# Patient Record
Sex: FEMALE | Race: WHITE | Employment: OTHER | URBAN - METROPOLITAN AREA
[De-identification: names, ages, dates, MRNs, and addresses within clinical notes are randomized per-mention and may not be internally consistent; named-entity substitution may affect disease eponyms.]

---

## 2017-04-27 PROCEDURE — 86480 TB TEST CELL IMMUN MEASURE: CPT | Performed by: INTERNAL MEDICINE

## 2017-04-27 PROCEDURE — 36415 COLL VENOUS BLD VENIPUNCTURE: CPT | Performed by: INTERNAL MEDICINE

## 2017-07-20 PROBLEM — M54.50 CHRONIC BILATERAL LOW BACK PAIN WITHOUT SCIATICA: Status: ACTIVE | Noted: 2017-07-20

## 2017-07-20 PROBLEM — G89.29 CHRONIC BILATERAL LOW BACK PAIN WITHOUT SCIATICA: Status: ACTIVE | Noted: 2017-07-20

## 2017-08-18 PROCEDURE — 88305 TISSUE EXAM BY PATHOLOGIST: CPT | Performed by: FAMILY MEDICINE

## 2018-04-26 PROCEDURE — 82746 ASSAY OF FOLIC ACID SERUM: CPT | Performed by: FAMILY MEDICINE

## 2018-04-26 PROCEDURE — 82607 VITAMIN B-12: CPT | Performed by: FAMILY MEDICINE

## 2018-04-26 PROCEDURE — 86480 TB TEST CELL IMMUN MEASURE: CPT | Performed by: FAMILY MEDICINE

## 2018-04-26 PROCEDURE — 80074 ACUTE HEPATITIS PANEL: CPT | Performed by: FAMILY MEDICINE

## 2018-11-05 PROBLEM — M76.891 TENDINITIS OF RIGHT HIP FLEXOR: Status: ACTIVE | Noted: 2018-11-05

## 2019-06-13 PROBLEM — D84.9 IMMUNOSUPPRESSION (HCC): Status: ACTIVE | Noted: 2019-06-13

## 2019-09-12 PROCEDURE — 86480 TB TEST CELL IMMUN MEASURE: CPT | Performed by: INTERNAL MEDICINE

## 2020-06-12 PROBLEM — H93.8X2 CLOGGED EAR, LEFT: Status: ACTIVE | Noted: 2020-06-12

## 2020-06-12 PROBLEM — H91.92 DECREASED HEARING OF LEFT EAR: Status: ACTIVE | Noted: 2020-06-12

## 2021-11-02 PROBLEM — G89.29 CHRONIC PAIN OF BOTH KNEES: Status: ACTIVE | Noted: 2021-11-02

## 2021-11-02 PROBLEM — M17.0 OSTEOARTHRITIS OF PATELLOFEMORAL JOINTS OF BOTH KNEES: Status: ACTIVE | Noted: 2021-11-02

## 2021-11-02 PROBLEM — M25.562 CHRONIC PAIN OF BOTH KNEES: Status: ACTIVE | Noted: 2021-11-02

## 2021-11-02 PROBLEM — M25.561 CHRONIC PAIN OF BOTH KNEES: Status: ACTIVE | Noted: 2021-11-02

## 2022-04-05 PROBLEM — M25.561 CHRONIC PAIN OF BOTH KNEES: Status: RESOLVED | Noted: 2021-11-02 | Resolved: 2022-04-05

## 2022-04-05 PROBLEM — G89.29 CHRONIC PAIN OF BOTH KNEES: Status: RESOLVED | Noted: 2021-11-02 | Resolved: 2022-04-05

## 2022-04-05 PROBLEM — H93.8X2 CLOGGED EAR, LEFT: Status: RESOLVED | Noted: 2020-06-12 | Resolved: 2022-04-05

## 2022-04-05 PROBLEM — M25.562 CHRONIC PAIN OF BOTH KNEES: Status: RESOLVED | Noted: 2021-11-02 | Resolved: 2022-04-05

## 2022-04-05 PROBLEM — H91.92 DECREASED HEARING OF LEFT EAR: Status: RESOLVED | Noted: 2020-06-12 | Resolved: 2022-04-05

## 2022-10-19 ENCOUNTER — ANESTHESIA EVENT (OUTPATIENT)
Dept: ENDOSCOPY | Facility: HOSPITAL | Age: 67
End: 2022-10-19
Payer: MEDICARE

## 2022-10-19 ENCOUNTER — HOSPITAL ENCOUNTER (OUTPATIENT)
Facility: HOSPITAL | Age: 67
Setting detail: HOSPITAL OUTPATIENT SURGERY
Discharge: HOME | End: 2022-10-19
Attending: INTERNAL MEDICINE | Admitting: INTERNAL MEDICINE
Payer: MEDICARE

## 2022-10-19 ENCOUNTER — ANESTHESIA (OUTPATIENT)
Dept: ENDOSCOPY | Facility: HOSPITAL | Age: 67
End: 2022-10-19
Payer: MEDICARE

## 2022-10-19 VITALS
DIASTOLIC BLOOD PRESSURE: 71 MMHG | SYSTOLIC BLOOD PRESSURE: 139 MMHG | WEIGHT: 216 LBS | HEIGHT: 68 IN | HEART RATE: 60 BPM | OXYGEN SATURATION: 100 % | BODY MASS INDEX: 32.74 KG/M2 | TEMPERATURE: 98 F | RESPIRATION RATE: 18 BRPM

## 2022-10-19 DIAGNOSIS — D12.2 ADENOMATOUS POLYP OF ASCENDING COLON: ICD-10-CM

## 2022-10-19 PROCEDURE — 88305 TISSUE EXAM BY PATHOLOGIST: CPT | Performed by: INTERNAL MEDICINE

## 2022-10-19 PROCEDURE — 0DBC8ZX EXCISION OF ILEOCECAL VALVE, VIA NATURAL OR ARTIFICIAL OPENING ENDOSCOPIC, DIAGNOSTIC: ICD-10-PCS | Performed by: INTERNAL MEDICINE

## 2022-10-19 RX ORDER — SODIUM CHLORIDE, SODIUM LACTATE, POTASSIUM CHLORIDE, CALCIUM CHLORIDE 600; 310; 30; 20 MG/100ML; MG/100ML; MG/100ML; MG/100ML
INJECTION, SOLUTION INTRAVENOUS CONTINUOUS
Status: DISCONTINUED | OUTPATIENT
Start: 2022-10-19 | End: 2022-10-19

## 2022-10-19 RX ADMIN — SODIUM CHLORIDE, SODIUM LACTATE, POTASSIUM CHLORIDE, CALCIUM CHLORIDE: 600; 310; 30; 20 INJECTION, SOLUTION INTRAVENOUS at 14:17:00

## 2022-10-19 RX ADMIN — SODIUM CHLORIDE, SODIUM LACTATE, POTASSIUM CHLORIDE, CALCIUM CHLORIDE: 600; 310; 30; 20 INJECTION, SOLUTION INTRAVENOUS at 13:23:00

## 2022-10-19 NOTE — OPERATIVE REPORT
[id number]  Brennan Rudolph  1/17/1955  10/19/2022      Preoperative Diagnosis: Recurrent tubulovillous adenomatous polyp the patient was referred for endoscopic mucosal resection  Postoperative Diagnosis: Ileocecal valve adenomatous polyp  Procedure Performed: Colonoscopy to the cecum with endoscopic mucosal resection    Anesthesia Given:   MAC  Colon  Preparation: Adequate  Colonoscopy withdrawal time: 48 minutes  Specimen: IVC polyp  Endoscopist:   Reagan Saldivar MD  EBL: none  Complications: no immediate  Informed Consent: Informed consent for both the procedure and sedation were obtained from the patient. The potentially life-threatening complications of sedation, bleeding,  Perforation, transfusion or repeat endoscopy were reviewed along with the possible need for hospitalization, surgical management, transfusion or repeat endoscopy should one of these complications arise. The patient understands and is agreeable to proceed. Procedure:   After the patient was interviewed and the procedure again discussed and questions addressed, the patient was brought to the GI Lab and monitoring of the B/P, pulse, and pulse oximetry was performed. The patient was then placed in the left lateral decubitus position and sedated with divided doses of IV medication; continuous vital signs were monitored throughout the procedure. The Olympus video colonoscope was then inserted into the rectum after an unremarkable digital rectal exam. The endoscope was advanced to the cecum without difficulty; upon insertion and withdrawl the mucosa was carefully examined and the preparation was adequate. The visualized mucosal pattern was unremarkable. The distal end clear detachable cap was fitted onto the tip of the scope prior to inserting it into the rectum. At the level of ileocecal valve on the ascending: Aspect of the there was a large sessile polyp. It measured approximately 2-1/2 cm.   An attempt to lift the polyp with injection of ORISE solution was unsuccessful due to with appeared to be scarring as well as fatty infiltration of the ileocecal valve. The polyp was resected with 13 mm hexagonal snare. Avulsion technique utilizing cold biopsy forceps was performed. The margins of resection were fulgurated with the soft coag forceps. Few superficial oozing blood vessels were controlled with the use of the soft coag grasper. At the anal verge the endoscope was retroverted and other than some small internal hemorrhoids, no other abnormalities were identified. The procedure was tolerated well and upon completion and throughout the vital signs were stable. The patient was informed of the endoscopic findings and was also given a copy of the findings, postoperative instructions, and postoperative precautions. IMPRESSION:  Findings described above    PLAN:  We will await final pathology results. We will discussed with the patient possible repeat colonoscopy with use of Endo router in 6 to 8 weeks versus referral for surgical resection however she may require colonoscopy prior to that to determine if there is any residual or recurrent disease. We will discuss that with her once we have final pathology results.     Ronni Espinoza MD

## 2022-10-19 NOTE — DISCHARGE INSTRUCTIONS

## 2024-07-22 NOTE — H&P
70yo F with UUI referred by Cecy.     Failed Makayla and Myrbetriq. Her main complaint is leakage when she stands up, has an episode almost daily and needs to change her underwear 1-2 times a day. Associated with urgency though. No RUCHI. Started on Trospium which did not work either and Gemtesa too expensive     Hx of Crohn's, s/p resection 2008; hx of small intestinal bacterial overgrowth; she has mild fecal incontinence. Has a lot of gas from SIBO as well. This happens several times per week              History/Other:         Current Outpatient Medications   Medication Sig Dispense Refill    ergocalciferol 1.25 MG (04991 UT) Oral Cap Take 1 capsule (50,000 Units total) by mouth once a week. 12 capsule 0    sulfaSALAzine 500 MG Oral Tab EC Take 3 tablets (1,500 mg total) by mouth 2 (two) times daily. 540 tablet 1    levothyroxine 75 MCG Oral Tab Take 1 tablet (75 mcg total) by mouth before breakfast. 90 tablet 3    adalimumab (HUMIRA PEN) 40 MG/0.4ML Subcutaneous Pen-injector Kit Inject 40 mg into the skin every 7 days. 4 each 11    magnesium 250 MG Oral Tab Take 400 mg by mouth 2 (two) times a day. glycinate        Cholecalciferol (VITAMIN D) 2000 UNITS Oral Tab Take 4,000 Units by mouth daily.        CALCIUM + D OR Take 1,000 mg by mouth daily.          MULTIVITAMINS OR TABS 1 TABLET DAILY               Past Medical History:   Diagnosis Date    Crohn's disease ([facility])      Hypothyroid 05/06/2013    Immunosuppression ([facility]) 06/13/2019    Inflammatory spondylopathies ([facility]) 10/08/2009    Intraabdominal fluid collection 03/29/2023    Osteoarthritis of patellofemoral joints of both knees 11/02/2021    RHEUMATOID ARTHRITIS      Rheumatoid arthritis involving multiple sites with positive rheumatoid factor ([facility]) 09/22/2016    Seasonal allergies 05/18/2015    Sepsis ([facility]) 03/29/2023    Small intestinal bacterial overgrowth (SIBO) 03/2024     Ervin pos breath test by H2 and methane criteria    Vitamin D deficiency  10/08/2009              Past Surgical History:   Procedure Laterality Date    COLONOSCOPY   7/27/2010     nl    COLONOSCOPY N/A 7/19/2022     Procedure: COLONOSCOPY w/ polypectomy;  Surgeon: Felisha Plascencia MD;  Location: [facility], [facility]    OTHER SURGICAL HISTORY   2/08     bowel resection                Family History   Problem Relation Age of Onset    Cancer Other      Cancer Maternal Aunt           bone    Breast Cancer Maternal Aunt 60    Diabetes Neg      Heart Disorder Neg           REVIEW OF SYSTEMS:     A 10-point comprehensive review of systems was negative with pertinent items noted in HPI.            Objective:  GENERAL: well developed, well nourished, in no apparent distress  HEENT: atraumatic, normocephalic  LUNGS: normal respiratory motion without distress  CARDIO:NA  Abd: Soft, non-tender, non-distended  : No SPT or CVAT                 Lab Results   Component Value Date     GLUCOSEDIP Negative 02/29/2024     BILIRUBIN Negative 02/29/2024     KETONESDIP Negative 02/29/2024     SPECGRAVITY <= 1.005 (A) 02/29/2024     BLOODU Negative 02/29/2024     PHURINE 6.0 02/29/2024     PROTEINDIP Negative 02/29/2024     UROBILIN 0.2 02/29/2024     NITRITE Negative 02/29/2024     LEUKOCYTES Moderate (A) 02/29/2024            PVR: 28ml           Assessment & Plan:  68yo F  Diagnoses and all orders for this visit:     Full incontinence of feces     Urge incontinence of urine  -     DULY SURGERY SCHEDULING UROLOGY; Future         Plan:  -PVR low  - Discussed Third line therapeautic options including  1. Posterior Tibial Nerve Stimulation- 12 week trial + monthly therapy thereafter - discussed risks and benefits  2. Botox in the bladder- risk of UTIs and catheterization discussed  3. Sacral neuromodulation with initial trial and definitive device implantation.          - including PNE trial   Risks and benefits and procedure of each discussed in detail   Failed 4 medications. Would like to go forward w  Axonics PNE for FI and UUI        Addendum:  Had 74% improvement of symptoms with PNE. Scheduled for Axonics implant        Jodee Agee,      The patient indicates understanding of these issues and agrees to the plan.

## 2024-07-23 NOTE — DISCHARGE INSTRUCTIONS
Post-Operative Information for Patients following Sacral Neuromodulation Stage I & Stage II: full implant or revision     Please cancel your appointment with me next week and make an appointment instead with Angela Abrams in 1 month    INCISIONS  Showering and bathing:   It is okay to shower 24 hours after your surgery.   Avoid baths/swimming/hot tubs for 4 weeks or until your incisions completely heal. Keeping incisions underwater can affect the healing process.   Your incisions are closed with absorbable sutures and skin glue or surgical tape on top. All sutures used are dissolvable and typically will fall out by 6 weeks, if they have not fallen out by the time of your follow up visit we will remove them.     You may let soapy water run over the incision. There is no need to scrub the incision. Allow the skin glue or surgical tape to fall off on its own.     ACTIVITIES  If you had a battery implant or battery change only you may resume your normal activities in 48 hours.   Do not lift heavy items, over 10 pounds for 2 weeks  Avoid activities where you bend at the waist for 2 weeks     DIET  You may resume a normal diet without restrictions    PAIN CONTROL  Incisional pain is normal. Rest and take your pain medication before the pain becomes too intense.  First step for your pain control is to use over the counter Tylenol and ibuprofen  For additional pain control, you may have been prescribed a narcotic~ pain medication, such as Oxycodone, Percocet, Norco, Tylenol #3, Valium, Tramadol, or Hydrocodone.   Use narcotic pain medications for severe pain not improved by Tylenol and ibuprofen in the first few days after surgery. Transition to only Tylenol or ibuprofen as soon as possible.      PAIN MEDICATION INFORMATION:  *The maximum amount of Tylenol you can take in a 24 hour period is 4000 mg. Taking over this amount could cause liver damage. Make sure that if you are taking additional narcotic pain medication it  does not contain acetaminophen, the active ingredient in Tylenol. lf it does, you must account for in your daily total. For example: Norco or Percocet typically contain 325mg of Tylenol. Wean this medication as tolerated. Tylenol is processed by your liver. Do not take Tylenol if you are have a history of liver failure. Do not mix with alcohol.   **You may take 200-800mg of ibuprofen (Advil Æ) every 8 hours as needed for pain. This will help with pain from inflammation (swelling). Ibuprofen is processed by your kidneys. If you have any history of kidney disease you should avoid ibuprofen and all types of NSAIDS (AleveÆ, MotrinÆ, AdvilÆ). Please take Ibuprofen with food. Avoid if you have a history of stomach ulcers. If you are taking the maximum dose of ibuprofen (800mg every 8 hours), reduce this amount to 200-400mg ibuprofen every 8 hours as your pain improves.   ~Remember that narcotics are addictive, sedating, and constipating.  You should be taking a stool softener once or twice a day while on this medication. If you are prescribed narcotic pain medication, please use the medication as instructed. Do not use more than instructed. Do not take this medication with alcohol, sedatives, anti-anxiety medications, or sleep aids.  ~It is important to keep narcotic pills safely stored, as it is at great risk of being stolen or misused by family, friends, or even strangers. Please be sure to dispose of leftover pain medication after you have recovered. You may dispose of unused narcotic medications in the trash with an unpleasant substance such as coffee grounds or cat litter. You can also check FDA.gov to assess which medications can be safely flushed down the toilet.      INFECTION  Serious infections with this procedure are a rare occurrence  The likelihood of infection increases anytime the skin is opened.   Please take all of the prescribed antibiotics for prevention  Symptoms suggesting infection include:   fever >  101.5 ?F (38.5?C)  foul smelling or yellow drainage at the incision site  pain, redness, and swelling.   If you experience these symptoms, contact your physician.    FOLLOW UP  Our office will call to schedule a follow up appointment with your surgeon or one of our physician assistants in 3-4 weeks.   If you do not hear from our office regarding this appointment 1-2 business days after your surgery please contact the office.      If questions or concerns:   Call [phone] to reach your physician's office or send a [place] message and either myself or one of my staff members will get back to you as soon as possible.     OR: Go to the nearest Emergency Room if you feel any signs of symptoms that warrant physician's immediate attention.     Jodee Agee DO, [facility]  [phone]       HOME INSTRUCTIONS  [facility] HOME CARE INSTRUCTIONS: POST-OP ANESTHESIA  The medication that you received for sedation or general anesthesia can last up to 24 hours. Your judgment and reflexes may be altered, even if you feel like your normal self.      We Recommend:   Do not drive any motor vehicle or bicycle   Avoid mowing the lawn, playing sports, or working with power tools/applicances (power saws, electric knives or mixers)   That you have someone stay with you on your first night home   Do not drink alcohol or take sleeping pills or tranquilizers   Do not sign legal documents within 24 hours of your procedure   If you had a nerve block for your surgery, take extra care not to put any pressure on your arm or hand for 24 hours    It is normal:  For you to have a sore throat if you had a breathing tube during surgery (while you were asleep!). The sore throat should get better within 48 hours. You can gargle with warm salt water (1/2 tsp in 4 oz warm water) or use a throat lozenge for comfort  To feel muscle aches or soreness especially in the abdomen, chest or neck. The achy feeling should go away in the next 24 hours  To  feel weak, sleepy or \"wiped out\". Your should start feeling better in the next 24 hours.   To experience mild discomforts such as sore lip or tongue, headache, cramps, gas pains or a bloated feeling in your abdomen.   To experience mild back pain or soreness for a day or two if you had spinal or epidural anesthesia.   If you had laparoscopic surgery, to feel shoulder pain or discomfort on the day of surgery.   For some patients to have nausea after surgery/anesthesia    If you feel nausea or experience vomiting:   Try to move around less.   Eat less than usual or drink only liquids until the next morning   Nausea should resolve in about 24 hours    If you have a problem when you are at home:    Call your surgeons office   Discharge Instructions: After Your Surgery  You’ve just had surgery. During surgery, you were given medicine called anesthesia to keep you relaxed and free of pain. After surgery, you may have some pain or nausea. This is common. Here are some tips for feeling better and getting well after surgery.   Going home  Your healthcare provider will show you how to take care of yourself when you go home. They'll also answer your questions. Have an adult family member or friend drive you home. For the first 24 hours after your surgery:   Don't drive or use heavy equipment.  Don't make important decisions or sign legal papers.  Take medicines as directed.  Don't drink alcohol.  Have someone stay with you, if needed. They can watch for problems and help keep you safe.  Be sure to go to all follow-up visits with your healthcare provider. And rest after your surgery for as long as your provider tells you to.   Coping with pain  If you have pain after surgery, pain medicine will help you feel better. Take it as directed, before pain becomes severe. Also, ask your healthcare provider or pharmacist about other ways to control pain. This might be with heat, ice, or relaxation. And follow any other instructions your  surgeon or nurse gives you.      Stay on schedule with your medicine.     Tips for taking pain medicine  To get the best relief possible, remember these points:   Pain medicines can upset your stomach. Taking them with a little food may help.  Most pain relievers taken by mouth need at least 20 to 30 minutes to start to work.  Don't wait till your pain becomes severe before you take your medicine. Try to time your medicine so that you can take it before starting an activity. This might be before you get dressed, go for a walk, or sit down for dinner.  Constipation is a common side effect of some pain medicines. Call your healthcare provider before taking any medicines such as laxatives or stool softeners to help ease constipation. Also ask if you should skip any foods. Drinking lots of fluids and eating foods such as fruits and vegetables that are high in fiber can also help. Remember, don't take laxatives unless your surgeon has prescribed them.  Drinking alcohol and taking pain medicine can cause dizziness and slow your breathing. It can even be deadly. Don't drink alcohol while taking pain medicine.  Pain medicine can make you react more slowly to things. Don't drive or run machinery while taking pain medicine.  Your healthcare provider may tell you to take acetaminophen to help ease your pain. Ask them how much you're supposed to take each day. Acetaminophen or other pain relievers may interact with your prescription medicines or other over-the-counter (OTC) medicines. Some prescription medicines have acetaminophen and other ingredients in them. Using both prescription and OTC acetaminophen for pain can cause you to accidentally overdose. Read the labels on your OTC medicines with care. This will help you to clearly know the list of ingredients, how much to take, and any warnings. It may also help you not take too much acetaminophen. If you have questions or don't understand the information, ask your pharmacist  or healthcare provider to explain it to you before you take the OTC medicine.   Managing nausea  Some people have an upset stomach (nausea) after surgery. This is often because of anesthesia, pain, or pain medicine, less movement of food in the stomach, or the stress of surgery. These tips will help you handle nausea and eat healthy foods as you get better. If you were on a special food plan before surgery, ask your healthcare provider if you should follow it while you get better. Check with your provider on how your eating should progress. It may depend on the surgery you had. These general tips may help:   Don't push yourself to eat. Your body will tell you when to eat and how much.  Start off with clear liquids and soup. They're easier to digest.  Next try semi-solid foods as you feel ready. These include mashed potatoes, applesauce, and gelatin.  Slowly move to solid foods. Don’t eat fatty, rich, or spicy foods at first.  Don't force yourself to have 3 large meals a day. Instead eat smaller amounts more often.  Take pain medicines with a small amount of solid food, such as crackers or toast. This helps prevent nausea.  When to call your healthcare provider  Call your healthcare provider right away if you have any of these:   You still have too much pain, or the pain gets worse, after taking the medicine. The medicine may not be strong enough. Or there may be a complication from the surgery.  You feel too sleepy, dizzy, or groggy. The medicine may be too strong.  Side effects such as nausea or vomiting. Your healthcare provider may advise taking other medicines to .  Skin changes such as rash, itching, or hives. This may mean you have an allergic reaction. Your provider may advise taking other medicines.  The incision looks different (for instance, part of it opens up).  Bleeding or fluid leaking from the incision site, and weren't told to expect that.  Fever of 100.4°F (38°C) or higher, or as directed by your  provider.  Call 911  Call 911 right away if you have:   Trouble breathing  Facial swelling    If you have obstructive sleep apnea   You were given anesthesia medicine during surgery to keep you comfortable and free of pain. After surgery, you may have more apnea spells because of this medicine and other medicines you were given. The spells may last longer than normal.    At home:  Keep using the continuous positive airway pressure (CPAP) device when you sleep. Unless your healthcare provider tells you not to, use it when you sleep, day or night. CPAP is a common device used to treat obstructive sleep apnea.  Talk with your provider before taking any pain medicine, muscle relaxants, or sedatives. Your provider will tell you about the possible dangers of taking these medicines.  Contact your provider if your sleeping changes a lot even when taking medicines as directed.  [employer] last reviewed this educational content on 10/1/2021  © 2736-3686 [employer]. All rights reserved. This information is not intended as a substitute for professional medical care. Always follow your healthcare professional's instructions.

## 2024-07-24 ENCOUNTER — ANESTHESIA EVENT (OUTPATIENT)
Dept: SURGERY | Facility: HOSPITAL | Age: 69
End: 2024-07-24
Payer: MEDICARE

## 2024-07-24 ENCOUNTER — ANESTHESIA (OUTPATIENT)
Dept: SURGERY | Facility: HOSPITAL | Age: 69
End: 2024-07-24
Payer: MEDICARE

## 2024-07-24 ENCOUNTER — APPOINTMENT (OUTPATIENT)
Dept: GENERAL RADIOLOGY | Facility: HOSPITAL | Age: 69
End: 2024-07-24
Attending: STUDENT IN AN ORGANIZED HEALTH CARE EDUCATION/TRAINING PROGRAM
Payer: MEDICARE

## 2024-07-24 ENCOUNTER — HOSPITAL ENCOUNTER (OUTPATIENT)
Facility: HOSPITAL | Age: 69
Setting detail: HOSPITAL OUTPATIENT SURGERY
Discharge: HOME | End: 2024-07-24
Attending: STUDENT IN AN ORGANIZED HEALTH CARE EDUCATION/TRAINING PROGRAM | Admitting: STUDENT IN AN ORGANIZED HEALTH CARE EDUCATION/TRAINING PROGRAM
Payer: MEDICARE

## 2024-07-24 VITALS
HEIGHT: 68 IN | HEART RATE: 52 BPM | SYSTOLIC BLOOD PRESSURE: 120 MMHG | OXYGEN SATURATION: 97 % | RESPIRATION RATE: 15 BRPM | DIASTOLIC BLOOD PRESSURE: 66 MMHG | WEIGHT: 143 LBS | BODY MASS INDEX: 21.67 KG/M2 | TEMPERATURE: 98 F

## 2024-07-24 PROCEDURE — 0JH70MZ INSERTION OF STIMULATOR GENERATOR INTO BACK SUBCUTANEOUS TISSUE AND FASCIA, OPEN APPROACH: ICD-10-PCS | Performed by: STUDENT IN AN ORGANIZED HEALTH CARE EDUCATION/TRAINING PROGRAM

## 2024-07-24 PROCEDURE — 01HY0MZ INSERTION OF NEUROSTIMULATOR LEAD INTO PERIPHERAL NERVE, OPEN APPROACH: ICD-10-PCS | Performed by: STUDENT IN AN ORGANIZED HEALTH CARE EDUCATION/TRAINING PROGRAM

## 2024-07-24 DEVICE — NEUROSTIMULATOR
Type: IMPLANTABLE DEVICE | Site: BACK | Status: FUNCTIONAL
Brand: AXONICS

## 2024-07-24 DEVICE — TINED LEAD KIT
Type: IMPLANTABLE DEVICE | Site: BACK | Status: FUNCTIONAL
Brand: AXONICS

## 2024-07-24 RX ORDER — NALOXONE HYDROCHLORIDE 0.4 MG/ML
80 INJECTION, SOLUTION INTRAMUSCULAR; INTRAVENOUS; SUBCUTANEOUS AS NEEDED
Status: DISCONTINUED | OUTPATIENT
Start: 2024-07-24 | End: 2024-07-24

## 2024-07-24 RX ORDER — HYDROMORPHONE HYDROCHLORIDE 1 MG/ML
0.6 INJECTION, SOLUTION INTRAMUSCULAR; INTRAVENOUS; SUBCUTANEOUS EVERY 5 MIN PRN
Status: DISCONTINUED | OUTPATIENT
Start: 2024-07-24 | End: 2024-07-24

## 2024-07-24 RX ORDER — METOCLOPRAMIDE HYDROCHLORIDE 5 MG/ML
10 INJECTION INTRAMUSCULAR; INTRAVENOUS EVERY 8 HOURS PRN
Status: DISCONTINUED | OUTPATIENT
Start: 2024-07-24 | End: 2024-07-24

## 2024-07-24 RX ORDER — GENTAMICIN 40 MG/ML
INJECTION, SOLUTION INTRAMUSCULAR; INTRAVENOUS AS NEEDED
Status: DISCONTINUED | OUTPATIENT
Start: 2024-07-24 | End: 2024-07-24 | Stop reason: HOSPADM

## 2024-07-24 RX ORDER — ACETAMINOPHEN 500 MG
1000 TABLET ORAL ONCE
Status: COMPLETED | OUTPATIENT
Start: 2024-07-24 | End: 2024-07-24

## 2024-07-24 RX ORDER — HYDROMORPHONE HYDROCHLORIDE 1 MG/ML
0.4 INJECTION, SOLUTION INTRAMUSCULAR; INTRAVENOUS; SUBCUTANEOUS EVERY 5 MIN PRN
Status: DISCONTINUED | OUTPATIENT
Start: 2024-07-24 | End: 2024-07-24

## 2024-07-24 RX ORDER — AMOXICILLIN AND CLAVULANATE POTASSIUM 875; 125 MG/1; MG/1
1 TABLET, FILM COATED ORAL 2 TIMES DAILY
Qty: 6 TABLET | Refills: 0 | Status: SHIPPED | OUTPATIENT
Start: 2024-07-24 | End: 2024-07-27

## 2024-07-24 RX ORDER — HYDROMORPHONE HYDROCHLORIDE 1 MG/ML
0.2 INJECTION, SOLUTION INTRAMUSCULAR; INTRAVENOUS; SUBCUTANEOUS EVERY 5 MIN PRN
Status: DISCONTINUED | OUTPATIENT
Start: 2024-07-24 | End: 2024-07-24

## 2024-07-24 RX ORDER — SODIUM CHLORIDE, SODIUM LACTATE, POTASSIUM CHLORIDE, CALCIUM CHLORIDE 600; 310; 30; 20 MG/100ML; MG/100ML; MG/100ML; MG/100ML
INJECTION, SOLUTION INTRAVENOUS CONTINUOUS
Status: DISCONTINUED | OUTPATIENT
Start: 2024-07-24 | End: 2024-07-24

## 2024-07-24 RX ORDER — LIDOCAINE HYDROCHLORIDE 10 MG/ML
INJECTION, SOLUTION EPIDURAL; INFILTRATION; INTRACAUDAL; PERINEURAL AS NEEDED
Status: DISCONTINUED | OUTPATIENT
Start: 2024-07-24 | End: 2024-07-24 | Stop reason: HOSPADM

## 2024-07-24 RX ORDER — BUPIVACAINE HYDROCHLORIDE 5 MG/ML
INJECTION, SOLUTION EPIDURAL; INTRACAUDAL AS NEEDED
Status: DISCONTINUED | OUTPATIENT
Start: 2024-07-24 | End: 2024-07-24 | Stop reason: HOSPADM

## 2024-07-24 RX ORDER — ONDANSETRON 2 MG/ML
4 INJECTION INTRAMUSCULAR; INTRAVENOUS EVERY 6 HOURS PRN
Status: DISCONTINUED | OUTPATIENT
Start: 2024-07-24 | End: 2024-07-24

## 2024-07-24 NOTE — ANESTHESIA POSTPROCEDURE EVALUATION
Patient: Brennan Rudolph    Procedure Summary       Date: 07/24/24 Room / Location: [facility] MAIN OR  / [facility] MAIN OR    Anesthesia Start: 1032 Anesthesia Stop:     Procedure: Axonics stage 1 and stage 2 right  lead placement and right battery implantation (Right: Back) Diagnosis: (Urgency incontinence, incontinence of feces unspecified)    Surgeons: Jodee Agee DO Anesthesiologist: Linh Carreno MD    Anesthesia Type: MAC ASA Status: 2            Anesthesia Type: MAC    Vitals Value Taken Time   /88 07/24/24 1129   Temp 98 °F (36.7 °C) 07/24/24 1129   Pulse 57 07/24/24 1129   Resp 9 07/24/24 1129   SpO2 100 % 07/24/24 1129   Vitals shown include unfiled device data.    [facility] AN Post Evaluation:   Patient Evaluated in PACU  Patient Participation: complete - patient participated  Level of Consciousness: sleepy but conscious  Pain Score: 0  Pain Management: adequate  Airway Patency:patent  Dental exam unchanged from preop  Yes    Cardiovascular Status: acceptable  Respiratory Status: acceptable  Postoperative Hydration acceptable      Parveen Rojas CRNA  7/24/2024 11:29 AM

## 2024-07-24 NOTE — OR PREOP
Second incision noted above buttocks. Closed with dermabond. Clean/dry/intact. Patient aware of both incisions.

## 2024-07-24 NOTE — OPERATIVE REPORT
[facility]  part of [facility]    Operative Note         Brennan Rudolph Location: OR   [facility] [id number] MRN [id number]   Admission Date 7/24/2024 Operation Date 7/24/2024   Attending Physician Jodee Agee DO       Patient Name: Brennan Rudolph     Preoperative Diagnosis: Urgency incontinence, incontinence of feces unspecified     Postoperative Diagnosis: Urgency incontinence, incontinence of feces unspecified     Procedure(s):  Axonics stage 1 and stage 2 right  lead placement and right battery implantation     Primary Surgeon: Jodee Agee DO           Anesthesia: MAC     Specimen: * No specimens in log *     Estimated Blood Loss: Blood Output: 10 mL (7/24/2024 11:04 AM)  Complications: none      Operative Summary:      CPT 98016, 24114, 22697    OPERATIVE INDICATIONS:   This patient has a history symptomatic refractory and recurrent overactive bladder symptoms of urge, frequency and urge incontinence. She has failed medications and behavioral modification and therefore underwent peripheral nerve evaluation as per incontinence guidelines for a third-line treatment option. She has done extremely well with greater than 50% improvement in her symptoms including both urge frequency and incontinence symptoms. She has a voiding diary proof of this, which has now been scanned at the electronic medical record. The patient was apprised of treatment options and wished to undergo full implant with stage I and II Axonics neurogenerator system implant. The procedure materials, personnel, indications, alternatives, risks, and benefits were described in detail. Consent was obtained. The patient now presents for definitive therapy.      PROCEDURE AND TECHNIQUE:     The patient was brought to the operative suite, properly identified utilizing two patient identifiers and placed in prone position per OR protocol.  The safety checklist was performed.  Preoperative antibiotics of vancomycin and gentamicin and  MAC anesthesia were administered.  The patient was prepped and draped in usual sterile fashion.  Using fluoroscopic localization, the approximate locations of the S2, S3 and S4 neural foramina were identified.  A timeout per protocol was then carried out.  Local injection of lidocaine/marcaine mix was administered.  The spinal needle was then passed and proper needle position was confirmed with fluoroscopy and by patient identification of location of sensation, direct observation of nhung, and observation of plantar flexion of the great toe.    The patient's right S3 neural foramen had the following response:    Lead 1 Location:      S3: ____ Left      __X__ Right             MOTOR RESPONSE:  Electrode 0 1 2 3   Anal Nhung + + + +   Flexion of Great Toe + + + +   Amplitude (mA) 0.75 1 1 1       We placed electrodes 0, 1, 2 leads below and 3 above the sacral plate. We then injected a solution of lidocaine and marcained overlying the pre-marked incisional site on the patient's RIGHT side, and this was then sequentially opened. The incision was then deepened to allow implantation of the Algentis neurogenerator. We then connected the Algentis neurogenerator to the system after the pocket was irrigated with gentamicin in sterile water. We then proceeded to place the generator into the pocket subsequent to which time, electrical programming analysis was performed.    Electrical analysis demonstrated normal impedance values throughout all lead points and accordingly the device was programmed to the same settings as her external stimulator box for less than 1 hour. The incision was then closed with a 2-0 Vicryl followed by 4-0 Monocryl subcuticular closure and Dermabond.  Counts were correct.      The patient was awakened from sedation and transferred to the PACU in stable condition.  Using the external test stimulator, the patient was programmed to the lead of optimum sensation prior to discharge. She will f/u with me in  1 month.       Implants:   Implant Name Type Inv. Item Serial No.  Lot No. LRB No. Used Action   NEUROSTIMULATOR PRGM RECHRG FREE MRI COND - [id number]  NEUROSTIMULATOR PRGM RECHRG FREE MRI COND [id number] [employer]on[employer]  N/A N/A 1 Implanted   KIT NEUROSTIMULATOR TINED LD ST LTX FREE - [id number]  KIT NEUROSTIMULATOR TINED LD ST LTX FREE [id number] [employer] WD N/A N/A 1 Implanted        Drains: none     Condition: stable to PACU       Jodee Agee DO

## 2024-07-24 NOTE — ANESTHESIA PREPROCEDURE EVALUATION
Anesthesia PreOp Note    HPI:     Brennan Rudolph is a 69 year old female who presents for preoperative consultation requested by: Jodee Agee DO    Date of Surgery: 7/24/2024    Procedure(s):  Axonics stage 1 and stage 2 lead placement and battery implantation  Indication: Urgency incontinence, incontinence of feces unspecified    Relevant Problems   No relevant active problems       NPO:                         History Review:  Patient Active Problem List    Diagnosis Date Noted    Osteoarthritis of patellofemoral joints of both knees 11/02/2021    Immunosuppression (HCC) 06/13/2019    Tendinitis of right hip flexor 11/05/2018    Chronic bilateral low back pain without sciatica 07/20/2017    Rheumatoid arthritis involving multiple sites with positive rheumatoid factor (HCC) 09/22/2016    Seasonal allergies 05/18/2015    Tendinitis of left ankle 12/05/2013    Hypothyroid 05/06/2013    Crohn's disease (HCC)     Inflammatory spondylopathies (HCC) 10/08/2009    Vitamin D deficiency 10/08/2009       Past Medical History:    Crohn disease (HCC)    Crohn's disease (HCC)    Deep vein thrombosis (HCC)    right upper extremity    Disorder of thyroid    History of blood transfusion    march 2023 - polyp removal    Hypothyroid    Immunosuppression (HCC)    Inflammatory spondylopathies (HCC)    Osteoarthritis of patellofemoral joints of both knees    PONV (postoperative nausea and vomiting)    RHEUMATOID ARTHRITIS    Rheumatoid arthritis involving multiple sites with positive rheumatoid factor (HCC)    Seasonal allergies    Visual impairment    Vitamin D deficiency       Past Surgical History:   Procedure Laterality Date    Colonoscopy  07/27/2010    nl    Colonoscopy N/A 10/19/2022    Procedure: COLONOSCOPY with endoscopic mucosal resection;  Surgeon: Agapito Waite MD;  Location: [facility] ENDOSCOPY    Other surgical history  02/01/2008    bowel resection    Other surgical history      march 2023 - right hemicolectomy        Medications Prior to Admission   Medication Sig Dispense Refill Last Dose    Adalimumab (HUMIRA PEN) 40 MG/0.4ML Subcutaneous Pen-injector Kit Inject 1 pen into the skin every 14 (fourteen) days. (Patient taking differently: Inject 1 pen  into the skin once a week.) 6 each 0     LEVOTHYROXINE 75 MCG Oral Tab TAKE 1 TABLET EVERY DAY BEFORE BREAKFAST 90 tablet 0     SULFASALAZINE 500 MG Oral Tab EC TAKE 3 TABLETS TWICE DAILY 540 tablet 1     magnesium 250 MG Oral Tab Take 1 tablet (250 mg total) by mouth daily.       triamcinolone acetonide 0.1 % External Cream Apply to AA on legs BID PRN for itchiness 453.6 g 2     MAGNESIUM OR Take by mouth.       Cholecalciferol (VITAMIN D) 2000 UNITS Oral Tab Take 6,000 Units by mouth.       CALCIUM + D OR Take 1,000 mg by mouth daily.         MULTIVITAMINS OR TABS 1 TABLET DAILY       polyethylene glycol, PEG 3350-KCl-NaBcb-NaCl-NaSulf, 236 g Oral Recon Soln Take as directed by MD office. 4000 mL 0     Methylcobalamin (B-12) 5000 MCG Oral Tablet Dispersible  (Patient not taking: Reported on 10/19/2022)        Current Facility-Administered Medications Ordered in Epic   Medication Dose Route Frequency Provider Last Rate Last Admin    lactated ringers infusion   Intravenous Continuous Jodee Agee,  20 mL/hr at 07/24/24 1017 New Bag at 07/24/24 1017    vancomycin (Vancocin) 1,000 mg in sodium chloride 0.9% 250 mL IVPB-ADDV  1,000 mg Intravenous Once Jodee Agee  mL/hr at 07/24/24 1017 1,000 mg at 07/24/24 1017    gentamicin (Garamycin) 300 mg in sodium chloride 0.9% 100 mL IVPB  300 mg Intravenous Once Jodee Agee, DO         No current [facility]-ordered outpatient medications on file.       Allergies   Allergen Reactions    Demerol     Morphine Sulfate In Dextrose     Valium        Family History   Problem Relation Age of Onset    Cancer Other     Cancer Maternal Aunt         bone    Breast Cancer Maternal Aunt 60    Diabetes Neg     Heart Disorder Neg      Social  History     Socioeconomic History    Marital status:    Tobacco Use    Smoking status: Never    Smokeless tobacco: Never    Tobacco comments:     quit nearly 40 years ago   Vaping Use    Vaping status: Never Used   Substance and Sexual Activity    Alcohol use: Yes     Comment: socially    Drug use: No       Available pre-op labs reviewed.             Vital Signs:  Body mass index is 21.74 kg/m².   height is 1.727 m (5' 8\") and weight is 64.9 kg (143 lb). Her blood pressure is 137/66 and her pulse is 71. Her respiration is 16 and oxygen saturation is 100%.   Vitals:    07/15/24 1716 07/24/24 1016   BP:  137/66   Pulse:  71   Resp:  16   TempSrc:  Oral   SpO2:  100%   Weight: 64.9 kg (143 lb)    Height: 1.727 m (5' 8\")         Anesthesia Evaluation     Patient summary reviewed    Airway   Mallampati: II  TM distance: >3 FB  Neck ROM: full  Dental - Dentition appears grossly intact     Pulmonary - normal exam   (-) asthma, sleep apnea  Cardiovascular - normal exam  (-) hypertension, past MI, dysrhythmias    ECG reviewed    Neuro/Psych    (-) CVA    GI/Hepatic/Renal    (-) GERD    Comments: Crohn's dz    Endo/Other    (+) hypothyroidism, arthritis (RA)  Abdominal                  Anesthesia Plan:   ASA:  2  Plan:   MAC  Post-op Pain Management: IV analgesics and Oral pain medication  Informed Consent Plan and Risks Discussed With:  Patient  Discussed plan with:  CRNA      I have informed Brennan Rudolph and/or legal guardian or family member of the nature of the anesthetic plan, benefits, risks including possible dental damage if relevant, major complications, and any alternative forms of anesthetic management.   All of the patient's questions were answered to the best of my ability. The patient desires the anesthetic management as planned.  JOHN WALLACE MD  7/24/2024 10:20 AM  Present on Admission:  **None**

## 2024-07-24 NOTE — INTERVAL H&P NOTE
Pre-op Diagnosis: Urgency incontinence, incontinence of feces unspecified    The above referenced H&P was reviewed by Jodee Agee DO on 7/24/2024, the patient was examined and no significant changes have occurred in the patient's condition since the H&P was performed.  I discussed with the patient and/or legal representative the potential benefits, risks and side effects of this procedure; the likelihood of the patient achieving goals; and potential problems that might occur during recuperation.  I discussed reasonable alternatives to the procedure, including risks, benefits and side effects related to the alternatives and risks related to not receiving this procedure.  We will proceed with procedure as planned.

## (undated) DEVICE — SHEET,DRAPE,53X77,STERILE: Brand: MEDLINE

## (undated) DEVICE — DRAPE,T,LAPARO,TRANS,STERILE: Brand: MEDLINE

## (undated) DEVICE — KIT ENDO ORCAPOD 160/180/190

## (undated) DEVICE — SINGLE USE ELECTROSURGICAL HEMOSTATIC FORCEPS: Brand: SINGLE USE ELECTROSURGICAL HEMOSTATIC FORCEPS

## (undated) DEVICE — MINOR GENERAL: Brand: MEDLINE INDUSTRIES, INC.

## (undated) DEVICE — 3M™ IOBAN™ 2 ANTIMICROBIAL INCISE DRAPE 6650EZ: Brand: IOBAN™ 2

## (undated) DEVICE — LINE MNTR ADLT SET O2 INTMD

## (undated) DEVICE — SUT COAT VCRL 2-0 27IN ABSRB UD 26MM CT-2

## (undated) DEVICE — C-ARM: Brand: UNBRANDED

## (undated) DEVICE — CAP SEALING, REVEAL 13.4MM

## (undated) DEVICE — ADHESIVE LIQ 2/3ML VI MASTISOL

## (undated) DEVICE — 3M™ TEGADERM™ TRANSPARENT FILM DRESSING FRAME STYLE, 1626W, 4 IN X 4-3/4 IN (10 CM X 12 CM), 50/CT 4CT/CASE: Brand: 3M™ TEGADERM™

## (undated) DEVICE — SNARE CAPTIFLEX MICRO-OVL OLY

## (undated) DEVICE — MEDI-VAC NON-CONDUCTIVE SUCTION TUBING 6MM X 1.8M (6FT.) L: Brand: CARDINAL HEALTH

## (undated) DEVICE — SUT MCRYL 4-0 18IN PS-2 ABSRB UD 19MM 3/8 CIR

## (undated) DEVICE — HEXAGONAL CAPTIVATOR STIFF 13M

## (undated) DEVICE — TRAP 4 CPTR CHMBR N EZ INLN

## (undated) DEVICE — SNARE 9MM 230CM 2.4MM EXACTO

## (undated) DEVICE — REMOTE CONTROL: Brand: AXONICS

## (undated) DEVICE — LEAD IMPLANT KIT: Brand: AXONICS

## (undated) DEVICE — GLOVE SUR 7 SENSICARE PI PIP CRM PWD F

## (undated) DEVICE — GLOVE SUR 7 SENSICARE PI PIP GRN PWD F

## (undated) DEVICE — DRAPE SHEET LG

## (undated) DEVICE — REM POLYHESIVE ADULT PATIENT RETURN ELECTRODE: Brand: VALLEYLAB

## (undated) DEVICE — SUT MCRYL 4-0 27IN ABSRB UD 19MM PS-2 3/8

## (undated) DEVICE — SOLUTION IRRIG 1000ML ST H2O AQUALITE PLAS

## (undated) DEVICE — KIT CLEAN ENDOKIT 1.1OZ GOWNX2

## (undated) DEVICE — ORISE GEL

## (undated) NOTE — LETTER
[address], [place], [place]  Authorization for Invasive Procedure                                                                                           1. I hereby authorize Sandra Royal MD, my physician and his/her assistants (if applicable), which may include medical students, residents, and/or fellows, to perform the following surgical operation/ procedure and administer such anesthesia as may be determined necessary by my physician: Operation/Procedure name (s) COLONOSCOPY with endoscopic mucosal resection on [facility] acute [facility]   2. I recognize that during the surgical operation/procedure, unforeseen conditions may necessitate additional or different procedures than those listed above. I, therefore, further authorize and request that the above-named surgeon, assistants, or designees perform such procedures as are, in their judgment, necessary and desirable. 3.   My surgeon/physician has discussed prior to my surgery the potential benefits, risks and side effects of this procedure; the likelihood of achieving goals; and potential problems that might occur during recuperation. They also discussed reasonable alternatives to the procedure, including risks, benefits, and side effects related to the alternatives and risks related to not receiving this procedure. I have had all my questions answered and I acknowledge that no guarantee has been made as to the result that may be obtained. 4.   Should the need arise during my operation/procedure, which includes change of level of care prior to discharge, I also consent to the administration of blood and/or blood products. Further, I understand that despite careful testing and screening of blood or blood products by collecting agencies, I may still be subject to ill effects as a result of receiving a blood transfusion and/or blood products.   The following are some, but not all, of the potential risks that can occur: fever and allergic reactions, hemolytic reactions, transmission of diseases such as Hepatitis, AIDS and Cytomegalovirus (CMV) and fluid overload. In the event that I wish to have an autologous transfusion of my own blood, or a directed donor transfusion, I will discuss this with my physician. Check only if Refusing Blood or Blood Products  I understand refusal of blood or blood products as deemed necessary by my physician may have serious consequences to my condition to include possible death. I hereby assume responsibility for my refusal and release the hospital, its personnel, and my physicians from any responsibility for the consequences of my refusal.           ____ Refuse      5. I authorize the use of any specimen, organs, tissues, body parts or foreign objects that may be removed from my body during the operation/procedure for diagnosis, research or teaching purposes and their subsequent disposal by hospital authorities. I also authorize the release of specimen test results and/or written reports to my treating physician on the hospital medical staff or other referring or consulting physicians involved in my care, at the discretion of the Pathologist or my treating physician. 6.   I consent to the photographing or videotaping of the operations or procedures to be performed, including appropriate portions of my body for medical, scientific, or educational purposes, provided my identity is not revealed by the pictures or by descriptive texts accompanying them. If the procedure has been photographed/videotaped, the surgeon will obtain the original picture, image, videotape or CD. The hospital will not be responsible for storage, release or maintenance of the picture, image, tape or CD.    7.   I consent to the presence of a  or observers in the operating room as deemed necessary by my physician or their designees.     8.   I recognize that in the event my procedure results in extended X-Ray/fluoroscopy time, I may develop a skin reaction. 9. If I have a Do Not Attempt Resuscitation (DNAR) order in place, that status will be suspended while in the operating room, procedural suite, and during the recovery period unless otherwise explicitly stated by me (or a person authorized to consent on my behalf). The surgeon or my attending physician will determine when the applicable recovery period ends for purposes of reinstating the DNAR order. 10. Patients having a sterilization procedure: I understand that if the procedure is successful the results will be permanent and it will therefore be impossible for me to inseminate, conceive, or bear children. I also understand that the procedure is intended to result in sterility, although the result has not been guaranteed. 11. I acknowledge that my physician has explained sedation/analgesia administration to me including the risk and benefits I consent to the administration of sedation/analgesia as may be necessary or desirable in the judgment of my physician. I CERTIFY THAT I HAVE READ AND FULLY UNDERSTAND THE ABOVE CONSENT TO OPERATION and/or OTHER PROCEDURE.     _________________________________________ _________________________________     ___________________________________  Signature of Patient     Signature of Responsible Person                   Printed Name of Responsible Person                              _________________________________________ ______________________________        ___________________________________  Signature of Witness         Date  Time         Relationship to Patient    STATEMENT OF PHYSICIAN My signature below affirms that prior to the time of the procedure; I have explained to the patient and/or his/her legal representative, the risks and benefits involved in the proposed treatment and any reasonable alternative to the proposed treatment.  I have also explained the risks and benefits involved in refusal of the proposed treatment and alternatives to the proposed treatment and have answered the patient's questions.  If I have a significant financial interest in a co-management agreement or a significant financial interest in any product or implant, or other significant relationship used in this procedure/surgery, I have disclosed this and had a discussion with my patient.     _______________________________________________________________ _____________________________  Iman Coke of Physician)                                                                                         (Date)                                   (Time)  Patient Name: Rina Gold    : 1955   Printed: 10/18/2022      Medical Record #: [id number]                                              Page 1 of 1